# Patient Record
Sex: MALE | Race: WHITE | NOT HISPANIC OR LATINO | Employment: FULL TIME | URBAN - METROPOLITAN AREA
[De-identification: names, ages, dates, MRNs, and addresses within clinical notes are randomized per-mention and may not be internally consistent; named-entity substitution may affect disease eponyms.]

---

## 2019-03-28 ENCOUNTER — OFFICE VISIT (OUTPATIENT)
Dept: MEDICAL GROUP | Facility: PHYSICIAN GROUP | Age: 54
End: 2019-03-28
Payer: COMMERCIAL

## 2019-03-28 VITALS
SYSTOLIC BLOOD PRESSURE: 142 MMHG | RESPIRATION RATE: 16 BRPM | DIASTOLIC BLOOD PRESSURE: 90 MMHG | WEIGHT: 197 LBS | HEIGHT: 68 IN | HEART RATE: 71 BPM | BODY MASS INDEX: 29.86 KG/M2 | TEMPERATURE: 98.1 F | OXYGEN SATURATION: 95 %

## 2019-03-28 DIAGNOSIS — R10.31 GROIN PAIN, RIGHT: ICD-10-CM

## 2019-03-28 DIAGNOSIS — Z23 NEED FOR VACCINATION: ICD-10-CM

## 2019-03-28 DIAGNOSIS — I10 ESSENTIAL HYPERTENSION: ICD-10-CM

## 2019-03-28 PROCEDURE — 99203 OFFICE O/P NEW LOW 30 MIN: CPT | Mod: 25 | Performed by: FAMILY MEDICINE

## 2019-03-28 PROCEDURE — 90471 IMMUNIZATION ADMIN: CPT | Performed by: FAMILY MEDICINE

## 2019-03-28 PROCEDURE — 90715 TDAP VACCINE 7 YRS/> IM: CPT | Performed by: FAMILY MEDICINE

## 2019-03-28 RX ORDER — LISINOPRIL 20 MG/1
20 TABLET ORAL DAILY
COMMUNITY
End: 2019-03-28 | Stop reason: SDUPTHER

## 2019-03-28 RX ORDER — LISINOPRIL 20 MG/1
20 TABLET ORAL DAILY
Qty: 30 TAB | Refills: 2 | Status: SHIPPED | OUTPATIENT
Start: 2019-03-28 | End: 2019-10-22 | Stop reason: SDUPTHER

## 2019-03-28 NOTE — ASSESSMENT & PLAN NOTE
Off and on experiencing pain in right groin. Can last for a couple days. No recurrent trigger. Does not take anything for it.

## 2019-03-28 NOTE — PROGRESS NOTES
Complaint: High BP     Subjective:     Riley Culver is a 53 y.o. male here today to establish care. Used to be followed at [facility] [place].    Essential hypertension  Has not been taking his lisinopril x months. Trying to lose weight so that he doesn't need treatment. Family history of high BP in his mother.    Groin pain, right  Off and on experiencing pain in right groin. Can last for a couple days. No recurrent trigger. Does not take anything for it.      No other concerns or complaints.    Current medicines (including changes today)  Current Outpatient Prescriptions   Medication Sig Dispense Refill   • lisinopril (PRINIVIL) 20 MG Tab Take 1 Tab by mouth every day. 30 Tab 2     No current facility-administered medications for this visit.      He  has a past medical history of History of attempted suicide and Hypertension.    Health Maintenance: needs Tdap. Needs to get eyes an teeth checked regularly.      Allergies: Tetracycline and Tylenol    Current Outpatient Prescriptions Ordered in Science   Medication Sig Dispense Refill   • lisinopril (PRINIVIL) 20 MG Tab Take 1 Tab by mouth every day. 30 Tab 2     No current Epic-ordered facility-administered medications on file.        Past Medical History:   Diagnosis Date   • History of attempted suicide    • Hypertension        History reviewed. No pertinent surgical history.    Social History   Substance Use Topics   • Smoking status: Never Smoker   • Smokeless tobacco: Never Used   • Alcohol use 1.2 oz/week     1 Cans of beer, 1 Shots of liquor per week      Comment: per week       Social History     Social History Narrative   • No narrative on file       Family History   Problem Relation Age of Onset   • Arthritis Mother    • Alcohol/Drug Mother    • Lung Disease Mother    • Heart Disease Mother    • Psychiatry Mother    • Hypertension Mother    • Hyperlipidemia Mother          ROS   Patient denies any fever, chills, unintentional weight gain/loss, fatigue, stroke symptoms,  "dizziness, headache, nasal congestion, sore-throat, cough, heartburn, chest pain, difficulty breathing, abdominal discomfort, diarrhea/constipation, burning with urination or frequency, joint or back pain, skin rashes, depression or anxiety.       Objective:     Blood pressure 142/90, pulse 71, temperature 36.7 °C (98.1 °F), resp. rate 16, height 1.727 m (5' 8\"), weight 89.4 kg (197 lb), SpO2 95 %. Body mass index is 29.95 kg/m².   Physical Exam:  Constitutional: Alert, no distress.  Skin: Warm, dry, good turgor, no rashes in visible areas.  Eye: Equal, round and reactive, conjunctiva clear, lids normal.  ENMT: Lips without lesions, good dentition, oropharynx clear.  Neck: Trachea midline, no masses, no thyromegaly. No cervical or supraclavicular lymphadenopathy  Respiratory: Unlabored respiratory effort, lungs clear to auscultation, no wheezes, no ronchi.  Cardiovascular: Normal S1, S2, no murmur, no extremity edema.  Abdomen: Soft, non-tender, no masses, no hepatosplenomegaly.  Right hip: FROM, some discomfort with external rotation.  Psych: Alert and oriented x3, appropriate affect and mood.        Assessment and Plan:   The following treatment plan was discussed    1. Essential hypertension  Uncontrolled. Recommended he monitor at home, keep diary.  - lisinopril (PRINIVIL) 20 MG Tab; Take 1 Tab by mouth every day.  Dispense: 30 Tab; Refill: 2    2. Need for vaccination  - Tdap =>8yo IM    3. Groin pain, right  RTC if persists, might be a labrum problem. Will then get XR, then MRI with contrast.      Followup: Return in about 4 weeks (around 4/25/2019).    Please note that this dictation was created using voice recognition software. I have made every reasonable attempt to correct obvious errors, but I expect that there are errors of grammar and possibly content that I did not discover before finalizing the note.           "

## 2019-03-28 NOTE — ASSESSMENT & PLAN NOTE
Has not been taking his lisinopril x months. Trying to lose weight so that he doesn't need treatment. Family history of high BP in his mother.